# Patient Record
Sex: FEMALE | Race: BLACK OR AFRICAN AMERICAN | NOT HISPANIC OR LATINO | URBAN - METROPOLITAN AREA
[De-identification: names, ages, dates, MRNs, and addresses within clinical notes are randomized per-mention and may not be internally consistent; named-entity substitution may affect disease eponyms.]

---

## 2023-12-25 ENCOUNTER — HOSPITAL ENCOUNTER (EMERGENCY)
Facility: HOSPITAL | Age: 23
Discharge: LEFT BEFORE DISCHARGE | End: 2023-12-25
Attending: EMERGENCY MEDICINE
Payer: MEDICAID

## 2023-12-25 VITALS
HEIGHT: 65 IN | TEMPERATURE: 98 F | WEIGHT: 169 LBS | BODY MASS INDEX: 28.16 KG/M2 | RESPIRATION RATE: 18 BRPM | SYSTOLIC BLOOD PRESSURE: 123 MMHG | DIASTOLIC BLOOD PRESSURE: 77 MMHG | HEART RATE: 100 BPM | OXYGEN SATURATION: 98 %

## 2023-12-25 DIAGNOSIS — Z53.21 ELOPED FROM EMERGENCY DEPARTMENT: Primary | ICD-10-CM

## 2023-12-25 PROCEDURE — 99281 EMR DPT VST MAYX REQ PHY/QHP: CPT

## 2023-12-26 NOTE — ED PROVIDER NOTES
"Encounter Date: 12/25/2023       History     Chief Complaint   Patient presents with    Female  Problem     Had bumps on the genitals that have become irritated and painful. Denies burning/blood with urination.      Patient is a 23 y.o. female who presents with painful genital lesions X 1 day who was evaluated initially in triage. States that "they started off as bumps and they popped and now they're painful.Patient is concerned about STD exposure. Denies fever, headache, chest pain, shortness of breath, wheezing, stridor, drooling, NVD, abdominal pain, constipation, urinary problems, joint problems, rashes, or any other complaints at this time.     Patient eloped before full exam was performed, therefore history and physical exam may be limited        Review of patient's allergies indicates:  No Known Allergies  No past medical history on file.  No past surgical history on file.  No family history on file.     Review of Systems   Reason unable to perform ROS: Patient initially evaluated in triage, however eloped before full review of systems and physical exam was performed.   Constitutional:  Negative for chills and fever.   HENT:  Negative for congestion and sore throat.    Respiratory:  Negative for shortness of breath and wheezing.    Cardiovascular:  Negative for chest pain.   Gastrointestinal:  Negative for abdominal distention, abdominal pain, diarrhea, nausea and vomiting.   Genitourinary:  Positive for genital sores. Negative for dysuria, flank pain, frequency, urgency, vaginal bleeding and vaginal discharge.   Musculoskeletal:  Negative for back pain, neck pain and neck stiffness.   Skin:  Negative for rash.   Neurological:  Negative for weakness.   Hematological:  Does not bruise/bleed easily.       Physical Exam     Initial Vitals [12/25/23 1808]   BP Pulse Resp Temp SpO2   123/77 100 18 98.3 °F (36.8 °C) 98 %      MAP       --         Physical Exam    Constitutional: She appears well-developed and " well-nourished.   Patient initially evaluated in triage, however eloped before full review of systems and physical exam was performed   Genitourinary:    Genitourinary Comments: Patient eloped before pelvic exam is able to be performed.       Neurological: She is alert.         ED Course   Procedures  Labs Reviewed - No data to display         Imaging Results    None          Medications - No data to display  Medical Decision Making  Patient was initially evaluated in triage by myself and orders were placed.  However, Pt eloped without completing diagnostic workup, treatment, full physical exam, and discussion. Pt left without staff knowledge. At last interview, pt was AAOx4, mentally competent, and hemodynamically stable with VSS. Waiting room x3 every 15 minutes, but no pt present.     Amount and/or Complexity of Data Reviewed  Labs: ordered.                                      Clinical Impression:  Final diagnoses:  [Z53.21] Eloped from emergency department (Primary)          ED Disposition Condition    Eloped Stable                Suzanne Dorman PA-C  12/26/23 [id number]